# Patient Record
Sex: FEMALE | Race: WHITE | Employment: OTHER | URBAN - NONMETROPOLITAN AREA
[De-identification: names, ages, dates, MRNs, and addresses within clinical notes are randomized per-mention and may not be internally consistent; named-entity substitution may affect disease eponyms.]

---

## 2021-07-15 ENCOUNTER — TELEPHONE (OUTPATIENT)
Dept: OBGYN CLINIC | Age: 71
End: 2021-07-15

## 2021-07-15 NOTE — TELEPHONE ENCOUNTER
Pt would like to have bone density done when she has her mamm. Could someone send and order to [facility] for her please. She is coming in August. Thanks.

## 2021-07-15 NOTE — TELEPHONE ENCOUNTER
I talked to this patient prior to Aimee scheduling her. She has not been seen in over 3 years so is technically a new patient and has only seen Dr. Avis Ruiz in the past.  I told her we could give order when she came in for the appointment and was established with the practice.

## 2021-08-23 ENCOUNTER — OFFICE VISIT (OUTPATIENT)
Dept: OBGYN CLINIC | Age: 71
End: 2021-08-23
Payer: MEDICARE

## 2021-08-23 VITALS
DIASTOLIC BLOOD PRESSURE: 77 MMHG | SYSTOLIC BLOOD PRESSURE: 134 MMHG | HEIGHT: 62 IN | BODY MASS INDEX: 26.91 KG/M2 | WEIGHT: 146.2 LBS

## 2021-08-23 DIAGNOSIS — N95.2 VAGINAL ATROPHY: ICD-10-CM

## 2021-08-23 DIAGNOSIS — Z78.0 ENCOUNTER FOR OSTEOPOROSIS SCREENING IN ASYMPTOMATIC POSTMENOPAUSAL PATIENT: ICD-10-CM

## 2021-08-23 DIAGNOSIS — Z13.820 ENCOUNTER FOR OSTEOPOROSIS SCREENING IN ASYMPTOMATIC POSTMENOPAUSAL PATIENT: ICD-10-CM

## 2021-08-23 DIAGNOSIS — Z12.31 ENCOUNTER FOR SCREENING MAMMOGRAM FOR MALIGNANT NEOPLASM OF BREAST: ICD-10-CM

## 2021-08-23 DIAGNOSIS — Z01.419 WOMEN'S ANNUAL ROUTINE GYNECOLOGICAL EXAMINATION: Primary | ICD-10-CM

## 2021-08-23 DIAGNOSIS — N94.10 DYSPAREUNIA IN FEMALE: ICD-10-CM

## 2021-08-23 PROCEDURE — G0101 CA SCREEN;PELVIC/BREAST EXAM: HCPCS | Performed by: NURSE PRACTITIONER

## 2021-08-23 PROCEDURE — G8427 DOCREV CUR MEDS BY ELIG CLIN: HCPCS | Performed by: NURSE PRACTITIONER

## 2021-08-23 PROCEDURE — G8417 CALC BMI ABV UP PARAM F/U: HCPCS | Performed by: NURSE PRACTITIONER

## 2021-08-23 RX ORDER — POTASSIUM CITRATE 10 MEQ/1
TABLET, EXTENDED RELEASE ORAL
COMMUNITY
Start: 2021-06-14

## 2021-08-23 RX ORDER — ATENOLOL 50 MG/1
TABLET ORAL
COMMUNITY
Start: 2021-06-14

## 2021-08-23 RX ORDER — IMMUNE GLOBULIN 10 PERCENT (HUMAN) WITH RECOMBINANT HUMAN HYALURONIDASE 5 G/50 ML
KIT SUBCUTANEOUS
COMMUNITY
Start: 2021-08-18

## 2021-08-23 RX ORDER — OXYBUTYNIN CHLORIDE 10 MG/1
TABLET, EXTENDED RELEASE ORAL
COMMUNITY
Start: 2021-06-14

## 2021-08-23 RX ORDER — SIMVASTATIN 40 MG
TABLET ORAL
COMMUNITY
Start: 2021-06-14

## 2021-08-23 RX ORDER — MONTELUKAST SODIUM 10 MG/1
TABLET ORAL
COMMUNITY
Start: 2021-06-14

## 2021-08-23 RX ORDER — TIOTROPIUM BROMIDE INHALATION SPRAY 1.56 UG/1
SPRAY, METERED RESPIRATORY (INHALATION)
COMMUNITY
Start: 2021-08-16

## 2021-08-23 RX ORDER — SUCRALFATE 1 G/1
TABLET ORAL
COMMUNITY
Start: 2021-08-21

## 2021-08-23 RX ORDER — PANTOPRAZOLE SODIUM 40 MG/1
TABLET, DELAYED RELEASE ORAL
COMMUNITY
Start: 2021-06-14

## 2021-08-23 RX ORDER — ESCITALOPRAM OXALATE 20 MG/1
TABLET ORAL
COMMUNITY
Start: 2021-06-14

## 2021-08-23 RX ORDER — HYDROCHLOROTHIAZIDE 12.5 MG/1
CAPSULE, GELATIN COATED ORAL
COMMUNITY
Start: 2021-08-21

## 2021-08-23 RX ORDER — PHENOL 1.4 %
AEROSOL, SPRAY (ML) MUCOUS MEMBRANE
COMMUNITY

## 2021-08-23 RX ORDER — ASPIRIN 81 MG/1
TABLET ORAL
COMMUNITY

## 2021-08-23 NOTE — PROGRESS NOTES
YEARLY PHYSICAL    Date of service: 2021    Alexis Lino  Is a 66 y.o.  female    PT's PCP is: Silvia Still MD     : 1950                                             Subjective:       No LMP recorded. Patient has had a hysterectomy. Are your menses regular: not applicable    OB History    Para Term  AB Living   4 3 3   1 3   SAB TAB Ectopic Molar Multiple Live Births   1         3      # Outcome Date GA Lbr Steve/2nd Weight Sex Delivery Anes PTL Lv   4 Term      Vag-Spont   MARTINE   3 Term      Vag-Spont   MARTINE   2 Term      Vag-Spont   MARTINE   1 SAB                 Social History     Tobacco Use   Smoking Status Never Smoker   Smokeless Tobacco Never Used        Social History     Substance and Sexual Activity   Alcohol Use Yes    Comment: occ       Family History   Problem Relation Age of Onset    Heart Attack Father     Alzheimer's Disease Mother     Breast Cancer Daughter        Any family history of breast or ovarian cancer:  Yes    Any family history of blood clots: No    Allergies: Amoxicillin, Codeine, Epinephrine, Molds & smuts, and Pollen extract      Current Outpatient Medications:     SPIRIVA RESPIMAT 1.25 MCG/ACT AERS inhaler, , Disp: , Rfl:     sucralfate (CARAFATE) 1 GM tablet, , Disp: , Rfl:     simvastatin (ZOCOR) 40 MG tablet, , Disp: , Rfl:     pantoprazole (PROTONIX) 40 MG tablet, , Disp: , Rfl:     oxybutynin (DITROPAN-XL) 10 MG extended release tablet, , Disp: , Rfl:     montelukast (SINGULAIR) 10 MG tablet, , Disp: , Rfl:     Melatonin 10 MG TABS, Take by mouth, Disp: , Rfl:     HYQVIA 5 GM/50ML KIT, , Disp: , Rfl:     hydroCHLOROthiazide (MICROZIDE) 12.5 MG capsule, , Disp: , Rfl:     escitalopram (LEXAPRO) 20 MG tablet, , Disp: , Rfl:     vitamin D 25 MCG (1000 UT) CAPS, Take by mouth, Disp: , Rfl:     atenolol (TENORMIN) 50 MG tablet, , Disp: , Rfl:     potassium citrate (UROCIT-K) 10 MEQ (1080 MG) extended release tablet, , Disp: , Rfl:     aspirin 81 MG EC tablet, Take by mouth, Disp: , Rfl:     Social History     Substance and Sexual Activity   Sexual Activity Not Currently    Partners: Male       Any bleeding or pain with intercourse: n/a    Last Yearly:  6/2018    Last pap: n/a    Last HPV: n/a    Last Mammogram: 8/2019    Last Dexascan 7/2019, osteopenia     Last colorectal screen- UTD    Do you do self breast exams: encouraged monthly SBE     Past Medical History:   Diagnosis Date    Depression     H/O basal cell carcinoma excision     High cholesterol     Hypertension     Osteoporosis     Pelvic prolapse     Stress incontinence        Past Surgical History:   Procedure Laterality Date    CYST REMOVAL      HYSTERECTOMY      w/o BSO    SKIN CANCER EXCISION         Family History   Problem Relation Age of Onset    Heart Attack Father     Alzheimer's Disease Mother     Breast Cancer Daughter        Chief Complaint   Patient presents with    Gynecologic Exam     S/P hysterectomy. Mammogram and dexa due. Voices no concerns. PE:  Vital Signs  Blood pressure 134/77, height 5' 2\" (1.575 m), weight 146 lb 3.2 oz (66.3 kg). Estimated body mass index is 26.74 kg/m² as calculated from the following:    Height as of this encounter: 5' 2\" (1.575 m). Weight as of this encounter: 146 lb 3.2 oz (66.3 kg). HPI: Patient presents for annual exam. Denies breast/pelvic pain. Pap not indicated. Mammogram and DEXA due. Wellness reviewed and UTD. Reports pain with intercourse related to dryness. Review of Systems   Genitourinary: Positive for dyspareunia. All other systems reviewed and are negative. Objective  Heent:   negative   Cor: regular rate and rhythm, no murmurs              Pul:clear to auscultation bilaterally- no wheezes, rales or rhonchi, normal air movement, no respiratory distress      GI: Abdomen soft, non-tender.  BS normal. No masses,  No organomegaly

## 2023-08-28 ENCOUNTER — OFFICE VISIT (OUTPATIENT)
Dept: OBGYN CLINIC | Age: 73
End: 2023-08-28
Payer: MEDICARE

## 2023-08-28 VITALS
HEIGHT: 62 IN | WEIGHT: 155.8 LBS | SYSTOLIC BLOOD PRESSURE: 139 MMHG | BODY MASS INDEX: 28.67 KG/M2 | DIASTOLIC BLOOD PRESSURE: 79 MMHG

## 2023-08-28 DIAGNOSIS — N64.4 MASTALGIA: ICD-10-CM

## 2023-08-28 DIAGNOSIS — L90.0 LICHEN SCLEROSUS: ICD-10-CM

## 2023-08-28 DIAGNOSIS — Z01.419 WOMEN'S ANNUAL ROUTINE GYNECOLOGICAL EXAMINATION: Primary | ICD-10-CM

## 2023-08-28 DIAGNOSIS — N64.59 INVERTED NIPPLE: ICD-10-CM

## 2023-08-28 PROCEDURE — G8427 DOCREV CUR MEDS BY ELIG CLIN: HCPCS | Performed by: NURSE PRACTITIONER

## 2023-08-28 PROCEDURE — G8419 CALC BMI OUT NRM PARAM NOF/U: HCPCS | Performed by: NURSE PRACTITIONER

## 2023-08-28 PROCEDURE — G0101 CA SCREEN;PELVIC/BREAST EXAM: HCPCS | Performed by: NURSE PRACTITIONER

## 2023-08-28 RX ORDER — CLOBETASOL PROPIONATE 0.5 MG/G
CREAM TOPICAL
Qty: 1 EACH | Refills: 0 | Status: SHIPPED | OUTPATIENT
Start: 2023-08-28

## 2023-08-28 RX ORDER — BUDESONIDE AND FORMOTEROL FUMARATE DIHYDRATE 160; 4.5 UG/1; UG/1
AEROSOL RESPIRATORY (INHALATION)
COMMUNITY
Start: 2023-08-25

## 2023-08-28 RX ORDER — IMMUNE GLOBULIN 10 PERCENT (HUMAN) WITH RECOMBINANT HUMAN HYALURONIDASE 10 G/100ML
KIT SUBCUTANEOUS
COMMUNITY
Start: 2023-08-15

## 2023-08-28 ASSESSMENT — ENCOUNTER SYMPTOMS
ABDOMINAL PAIN: 0
CONSTIPATION: 0
SHORTNESS OF BREATH: 0
DIARRHEA: 0

## 2023-08-28 NOTE — PROGRESS NOTES
Breast exam comments: Dense tissue bilaterally. Glendy Carpenter HENT:      Head: Normocephalic and atraumatic. Eyes:      Extraocular Movements: Extraocular movements intact. Pupils: Pupils are equal, round, and reactive to light. Cardiovascular:      Rate and Rhythm: Normal rate. Pulmonary:      Effort: Pulmonary effort is normal.   Abdominal:      Palpations: Abdomen is soft. Tenderness: There is no abdominal tenderness. There is no guarding or rebound. Musculoskeletal:         General: Normal range of motion. Neurological:      General: No focal deficit present. Mental Status: She is alert and oriented to person, place, and time. Skin:     General: Skin is warm and dry. Psychiatric:         Mood and Affect: Mood normal.         Behavior: Behavior normal.                           Assessment & Plan  1. Women's annual routine gynecological examination  Repeat Annual every 1 year  Cervical Cytology Evaluation begins at 24years old. If Negative Cytology, Follow-up screening per current guidelines. Mammograms every 1year. If 36 yo and last mammogram was negative. Routine healthmaintenance per patients PCP. 2. Mastalgia  - NICHOLE DIAGNOSTIC W OR WO CAD BILATERAL; Future    3. Inverted nipple  Not present on exam today but patient concerned because at times will be inverted and never this was in the past. Diagnostic imaging ordered and faxed to [facility]. Patient agreeable   - NICHOLE DIAGNOSTIC W OR WO CAD BILATERAL; Future    4. Lichen sclerosus  Classic LS appearance. Rx for clobetasol sent and direction for application given. If no improvement, patient aware to call. - clobetasol (TEMOVATE) 0.05 % cream; Apply topically 2 times daily. Dispense: 1 each; Refill: 0          Return for yearly.      Electronically Signed by VIC Sharpe NP

## 2023-09-19 DIAGNOSIS — N64.4 MASTALGIA: ICD-10-CM

## 2023-09-19 DIAGNOSIS — N64.59 INVERTED NIPPLE: ICD-10-CM

## 2023-12-29 DIAGNOSIS — L90.0 LICHEN SCLEROSUS: ICD-10-CM

## 2024-01-02 RX ORDER — CLOBETASOL PROPIONATE 0.5 MG/G
CREAM TOPICAL
Qty: 15 G | Refills: 3 | Status: SHIPPED | OUTPATIENT
Start: 2024-01-02

## 2024-01-02 NOTE — TELEPHONE ENCOUNTER
Patient had an annual 8/2023, but only one tube given with no refills.  Ok to refill until next annual?

## 2024-09-03 ENCOUNTER — HOSPITAL ENCOUNTER (OUTPATIENT)
Age: 74
Setting detail: SPECIMEN
Discharge: HOME | End: 2024-09-03

## 2024-09-03 ENCOUNTER — OFFICE VISIT (OUTPATIENT)
Dept: OBGYN CLINIC | Age: 74
End: 2024-09-03
Payer: MEDICARE

## 2024-09-03 VITALS — SYSTOLIC BLOOD PRESSURE: 128 MMHG | BODY MASS INDEX: 28.5 KG/M2 | DIASTOLIC BLOOD PRESSURE: 82 MMHG | WEIGHT: 155.8 LBS

## 2024-09-03 DIAGNOSIS — L90.0 LICHEN SCLEROSUS: Primary | ICD-10-CM

## 2024-09-03 DIAGNOSIS — R32 URINARY INCONTINENCE, UNSPECIFIED TYPE: ICD-10-CM

## 2024-09-03 DIAGNOSIS — Z13.820 ENCOUNTER FOR OSTEOPOROSIS SCREENING IN ASYMPTOMATIC POSTMENOPAUSAL PATIENT: ICD-10-CM

## 2024-09-03 DIAGNOSIS — L90.0 LICHEN SCLEROSUS: ICD-10-CM

## 2024-09-03 DIAGNOSIS — Z78.0 ENCOUNTER FOR OSTEOPOROSIS SCREENING IN ASYMPTOMATIC POSTMENOPAUSAL PATIENT: ICD-10-CM

## 2024-09-03 PROCEDURE — G8427 DOCREV CUR MEDS BY ELIG CLIN: HCPCS | Performed by: NURSE PRACTITIONER

## 2024-09-03 PROCEDURE — 1123F ACP DISCUSS/DSCN MKR DOCD: CPT | Performed by: NURSE PRACTITIONER

## 2024-09-03 PROCEDURE — G8419 CALC BMI OUT NRM PARAM NOF/U: HCPCS | Performed by: NURSE PRACTITIONER

## 2024-09-03 PROCEDURE — 1036F TOBACCO NON-USER: CPT | Performed by: NURSE PRACTITIONER

## 2024-09-03 PROCEDURE — 1090F PRES/ABSN URINE INCON ASSESS: CPT | Performed by: NURSE PRACTITIONER

## 2024-09-03 PROCEDURE — 3017F COLORECTAL CA SCREEN DOC REV: CPT | Performed by: NURSE PRACTITIONER

## 2024-09-03 PROCEDURE — 0509F URINE INCON PLAN DOCD: CPT | Performed by: NURSE PRACTITIONER

## 2024-09-03 PROCEDURE — 99214 OFFICE O/P EST MOD 30 MIN: CPT | Performed by: NURSE PRACTITIONER

## 2024-09-03 PROCEDURE — G8399 PT W/DXA RESULTS DOCUMENT: HCPCS | Performed by: NURSE PRACTITIONER

## 2024-09-03 RX ORDER — ACETAMINOPHEN AND CODEINE PHOSPHATE 300; 30 MG/1; MG/1
TABLET ORAL
COMMUNITY
Start: 2024-08-06

## 2024-09-03 RX ORDER — ONDANSETRON 4 MG/1
TABLET, ORALLY DISINTEGRATING ORAL
COMMUNITY
Start: 2024-08-02

## 2024-09-03 RX ORDER — MIRABEGRON 50 MG/1
50 TABLET, EXTENDED RELEASE ORAL DAILY
Qty: 30 TABLET | Refills: 2 | Status: SHIPPED | OUTPATIENT
Start: 2024-09-03

## 2024-09-03 RX ORDER — CLOBETASOL PROPIONATE 0.5 MG/G
OINTMENT TOPICAL
Qty: 1 EACH | Refills: 1 | Status: SHIPPED | OUTPATIENT
Start: 2024-09-03 | End: 2024-09-04

## 2024-09-03 RX ORDER — FUROSEMIDE 20 MG
TABLET ORAL
COMMUNITY
Start: 2019-05-06

## 2024-09-03 ASSESSMENT — ENCOUNTER SYMPTOMS: RESPIRATORY NEGATIVE: 1

## 2024-09-03 NOTE — PROGRESS NOTES
PROBLEM VISIT     Date of service: 9/3/2024    Torri Khan  Is a 74 y.o. female    PT's PCP is: Reji Goncalves MD     : 1950                                             Subjective:       No LMP recorded. Patient has had a hysterectomy.   OB History    Para Term  AB Living   4 3 3   1 3   SAB IAB Ectopic Molar Multiple Live Births   1         3      # Outcome Date GA Lbr Steve/2nd Weight Sex Type Anes PTL Lv   4 Term      Vag-Spont   MARTINE   3 Term      Vag-Spont   MARTINE   2 Term      Vag-Spont   MARTINE   1 SAB                 Social History     Tobacco Use   Smoking Status Never   Smokeless Tobacco Never        Social History     Substance and Sexual Activity   Alcohol Use Yes    Comment: occ       Allergies: Amoxicillin, Codeine, Epinephrine, Molds & smuts, and Pollen extract      Current Outpatient Medications:     ondansetron (ZOFRAN-ODT) 4 MG disintegrating tablet, 1 BY MOUTH TAKE TWICE A DAY AS NEEDED FOR NAUSEA, Disp: , Rfl:     furosemide (LASIX) 20 MG tablet, TAKE 1 TABLET DAILY AS NEEDED FOR SWELLING, Disp: , Rfl:     acetaminophen-codeine (TYLENOL #3) 300-30 MG per tablet, TAKE 1 TABLET BY MOUTH ECVERY 4-6 HOURS AS NEEDED FOR PAIN, NOT TO EXCEED 4/DAY, Disp: , Rfl:     clobetasol (TEMOVATE) 0.05 % ointment, Apply topically 2 times daily., Disp: 1 each, Rfl: 1    mirabegron (MYRBETRIQ) 50 MG TB24, Take 50 mg by mouth daily, Disp: 30 tablet, Rfl: 2    SYMBICORT 160-4.5 MCG/ACT AERO, , Disp: , Rfl:     SPIRIVA RESPIMAT 1.25 MCG/ACT AERS inhaler, , Disp: , Rfl:     sucralfate (CARAFATE) 1 GM tablet, , Disp: , Rfl:     simvastatin (ZOCOR) 40 MG tablet, , Disp: , Rfl:     pantoprazole (PROTONIX) 40 MG tablet, , Disp: , Rfl:     oxybutynin (DITROPAN-XL) 10 MG extended release tablet, , Disp: , Rfl:     montelukast (SINGULAIR) 10 MG tablet, , Disp: , Rfl:     Melatonin 10 MG TABS, Take by mouth, Disp: , Rfl:     hydroCHLOROthiazide (MICROZIDE) 12.5 MG capsule, , Disp: , Rfl:

## 2024-09-04 ENCOUNTER — TELEPHONE (OUTPATIENT)
Dept: OBGYN CLINIC | Age: 74
End: 2024-09-04

## 2024-09-04 LAB
MICROORGANISM SPEC CULT: NORMAL
SERVICE CMNT-IMP: NORMAL
SPECIMEN DESCRIPTION: NORMAL

## 2024-09-04 RX ORDER — BETAMETHASONE DIPROPIONATE 0.5 MG/G
OINTMENT, AUGMENTED TOPICAL
Qty: 1 EACH | Refills: 2 | Status: SHIPPED | OUTPATIENT
Start: 2024-09-04

## 2024-09-05 NOTE — RESULT ENCOUNTER NOTE
Pt aware of results and recommendation. States Myrbetriq is over $300 and cannot pay that. Requesting cheaper medication.

## 2024-10-15 ENCOUNTER — OFFICE VISIT (OUTPATIENT)
Dept: OBGYN CLINIC | Age: 74
End: 2024-10-15
Payer: MEDICARE

## 2024-10-15 VITALS
HEIGHT: 62 IN | WEIGHT: 156.7 LBS | DIASTOLIC BLOOD PRESSURE: 72 MMHG | BODY MASS INDEX: 28.84 KG/M2 | SYSTOLIC BLOOD PRESSURE: 126 MMHG

## 2024-10-15 DIAGNOSIS — L90.0 LICHEN SCLEROSUS: Primary | ICD-10-CM

## 2024-10-15 DIAGNOSIS — R32 URINARY INCONTINENCE, UNSPECIFIED TYPE: ICD-10-CM

## 2024-10-15 PROCEDURE — 3017F COLORECTAL CA SCREEN DOC REV: CPT | Performed by: NURSE PRACTITIONER

## 2024-10-15 PROCEDURE — 99213 OFFICE O/P EST LOW 20 MIN: CPT | Performed by: NURSE PRACTITIONER

## 2024-10-15 PROCEDURE — 1090F PRES/ABSN URINE INCON ASSESS: CPT | Performed by: NURSE PRACTITIONER

## 2024-10-15 PROCEDURE — G8427 DOCREV CUR MEDS BY ELIG CLIN: HCPCS | Performed by: NURSE PRACTITIONER

## 2024-10-15 PROCEDURE — G8419 CALC BMI OUT NRM PARAM NOF/U: HCPCS | Performed by: NURSE PRACTITIONER

## 2024-10-15 PROCEDURE — 0509F URINE INCON PLAN DOCD: CPT | Performed by: NURSE PRACTITIONER

## 2024-10-15 PROCEDURE — G8484 FLU IMMUNIZE NO ADMIN: HCPCS | Performed by: NURSE PRACTITIONER

## 2024-10-15 PROCEDURE — 1036F TOBACCO NON-USER: CPT | Performed by: NURSE PRACTITIONER

## 2024-10-15 PROCEDURE — G8399 PT W/DXA RESULTS DOCUMENT: HCPCS | Performed by: NURSE PRACTITIONER

## 2024-10-15 PROCEDURE — 1123F ACP DISCUSS/DSCN MKR DOCD: CPT | Performed by: NURSE PRACTITIONER

## 2024-10-15 RX ORDER — SOLIFENACIN SUCCINATE 5 MG/1
5 TABLET, FILM COATED ORAL DAILY
COMMUNITY
Start: 2024-09-18

## 2024-10-15 ASSESSMENT — ENCOUNTER SYMPTOMS
GASTROINTESTINAL NEGATIVE: 1
RESPIRATORY NEGATIVE: 1

## 2024-10-15 NOTE — PROGRESS NOTES
PROBLEM VISIT     Date of service: 10/15/2024    Torri Khan  Is a 74 y.o. female    PT's PCP is: Reji Goncalves MD     : 1950                                             Subjective:       No LMP recorded. Patient has had a hysterectomy.   OB History    Para Term  AB Living   4 3 3   1 3   SAB IAB Ectopic Molar Multiple Live Births   1         3      # Outcome Date GA Lbr Steve/2nd Weight Sex Type Anes PTL Lv   4 Term      Vag-Spont   MARTINE   3 Term      Vag-Spont   MARTINE   2 Term      Vag-Spont   MARTINE   1 SAB                 Social History     Tobacco Use   Smoking Status Never   Smokeless Tobacco Never        Social History     Substance and Sexual Activity   Alcohol Use Yes    Comment: occ       Allergies: Amoxicillin, Codeine, Epinephrine, Molds & smuts, and Pollen extract      Current Outpatient Medications:     solifenacin (VESICARE) 5 MG tablet, Take 1 tablet by mouth daily, Disp: , Rfl:     augmented betamethasone dipropionate (DIPROLENE-AF) 0.05 % ointment, Apply BID to affected area, Disp: 1 each, Rfl: 2    ondansetron (ZOFRAN-ODT) 4 MG disintegrating tablet, 1 BY MOUTH TAKE TWICE A DAY AS NEEDED FOR NAUSEA, Disp: , Rfl:     furosemide (LASIX) 20 MG tablet, TAKE 1 TABLET DAILY AS NEEDED FOR SWELLING, Disp: , Rfl:     acetaminophen-codeine (TYLENOL #3) 300-30 MG per tablet, TAKE 1 TABLET BY MOUTH ECVERY 4-6 HOURS AS NEEDED FOR PAIN, NOT TO EXCEED 4/DAY, Disp: , Rfl:     mirabegron (MYRBETRIQ) 50 MG TB24, Take 50 mg by mouth daily, Disp: 30 tablet, Rfl: 2    SYMBICORT 160-4.5 MCG/ACT AERO, , Disp: , Rfl:     SPIRIVA RESPIMAT 1.25 MCG/ACT AERS inhaler, , Disp: , Rfl:     sucralfate (CARAFATE) 1 GM tablet, , Disp: , Rfl:     simvastatin (ZOCOR) 40 MG tablet, , Disp: , Rfl:     pantoprazole (PROTONIX) 40 MG tablet, , Disp: , Rfl:     oxybutynin (DITROPAN-XL) 10 MG extended release tablet, , Disp: , Rfl:     montelukast (SINGULAIR) 10 MG tablet, , Disp: , Rfl:     Melatonin 10 MG  Plan for reassurance, recheck BP, f/u with Dr. Peterson.

## 2024-10-24 DIAGNOSIS — Z78.0 ENCOUNTER FOR OSTEOPOROSIS SCREENING IN ASYMPTOMATIC POSTMENOPAUSAL PATIENT: ICD-10-CM

## 2024-10-24 DIAGNOSIS — Z13.820 ENCOUNTER FOR OSTEOPOROSIS SCREENING IN ASYMPTOMATIC POSTMENOPAUSAL PATIENT: ICD-10-CM

## 2024-10-24 PROCEDURE — 77080 DXA BONE DENSITY AXIAL: CPT | Performed by: NURSE PRACTITIONER
